# Patient Record
Sex: MALE | Race: OTHER | HISPANIC OR LATINO | URBAN - METROPOLITAN AREA
[De-identification: names, ages, dates, MRNs, and addresses within clinical notes are randomized per-mention and may not be internally consistent; named-entity substitution may affect disease eponyms.]

---

## 2018-09-07 ENCOUNTER — EMERGENCY (EMERGENCY)
Facility: HOSPITAL | Age: 38
LOS: 1 days | Discharge: HOME | End: 2018-09-07
Attending: EMERGENCY MEDICINE | Admitting: EMERGENCY MEDICINE
Payer: COMMERCIAL

## 2018-09-07 VITALS
OXYGEN SATURATION: 100 % | TEMPERATURE: 99 F | RESPIRATION RATE: 18 BRPM | DIASTOLIC BLOOD PRESSURE: 84 MMHG | SYSTOLIC BLOOD PRESSURE: 123 MMHG | HEART RATE: 81 BPM

## 2018-09-07 VITALS
OXYGEN SATURATION: 98 % | RESPIRATION RATE: 18 BRPM | SYSTOLIC BLOOD PRESSURE: 119 MMHG | DIASTOLIC BLOOD PRESSURE: 81 MMHG | HEART RATE: 76 BPM

## 2018-09-07 LAB
ANION GAP SERPL CALC-SCNC: 11 MMOL/L — SIGNIFICANT CHANGE UP (ref 5–17)
BASOPHILS NFR BLD AUTO: 0.1 % — SIGNIFICANT CHANGE UP (ref 0–2)
BUN SERPL-MCNC: 14 MG/DL — SIGNIFICANT CHANGE UP (ref 7–23)
CALCIUM SERPL-MCNC: 9.9 MG/DL — SIGNIFICANT CHANGE UP (ref 8.4–10.5)
CHLORIDE SERPL-SCNC: 102 MMOL/L — SIGNIFICANT CHANGE UP (ref 96–108)
CO2 SERPL-SCNC: 26 MMOL/L — SIGNIFICANT CHANGE UP (ref 22–31)
CREAT SERPL-MCNC: 1.19 MG/DL — SIGNIFICANT CHANGE UP (ref 0.5–1.3)
EOSINOPHIL NFR BLD AUTO: 0.5 % — SIGNIFICANT CHANGE UP (ref 0–6)
GLUCOSE SERPL-MCNC: 101 MG/DL — HIGH (ref 70–99)
HCT VFR BLD CALC: 42.4 % — SIGNIFICANT CHANGE UP (ref 39–50)
HGB BLD-MCNC: 14.4 G/DL — SIGNIFICANT CHANGE UP (ref 13–17)
LYMPHOCYTES # BLD AUTO: 14.9 % — SIGNIFICANT CHANGE UP (ref 13–44)
MCHC RBC-ENTMCNC: 30.5 PG — SIGNIFICANT CHANGE UP (ref 27–34)
MCHC RBC-ENTMCNC: 34 G/DL — SIGNIFICANT CHANGE UP (ref 32–36)
MCV RBC AUTO: 89.8 FL — SIGNIFICANT CHANGE UP (ref 80–100)
MONOCYTES NFR BLD AUTO: 6.7 % — SIGNIFICANT CHANGE UP (ref 2–14)
NEUTROPHILS NFR BLD AUTO: 77.8 % — HIGH (ref 43–77)
PLATELET # BLD AUTO: 192 K/UL — SIGNIFICANT CHANGE UP (ref 150–400)
POTASSIUM SERPL-MCNC: 4.9 MMOL/L — SIGNIFICANT CHANGE UP (ref 3.5–5.3)
POTASSIUM SERPL-SCNC: 4.9 MMOL/L — SIGNIFICANT CHANGE UP (ref 3.5–5.3)
RBC # BLD: 4.72 M/UL — SIGNIFICANT CHANGE UP (ref 4.2–5.8)
RBC # FLD: 12.6 % — SIGNIFICANT CHANGE UP (ref 10.3–16.9)
SODIUM SERPL-SCNC: 139 MMOL/L — SIGNIFICANT CHANGE UP (ref 135–145)
TROPONIN T SERPL-MCNC: <0.01 NG/ML — SIGNIFICANT CHANGE UP (ref 0–0.01)
WBC # BLD: 10.7 K/UL — HIGH (ref 3.8–10.5)
WBC # FLD AUTO: 10.7 K/UL — HIGH (ref 3.8–10.5)

## 2018-09-07 PROCEDURE — 99284 EMERGENCY DEPT VISIT MOD MDM: CPT | Mod: 25

## 2018-09-07 PROCEDURE — 85025 COMPLETE CBC W/AUTO DIFF WBC: CPT

## 2018-09-07 PROCEDURE — 71046 X-RAY EXAM CHEST 2 VIEWS: CPT

## 2018-09-07 PROCEDURE — 36415 COLL VENOUS BLD VENIPUNCTURE: CPT

## 2018-09-07 PROCEDURE — 96374 THER/PROPH/DIAG INJ IV PUSH: CPT

## 2018-09-07 PROCEDURE — 99285 EMERGENCY DEPT VISIT HI MDM: CPT | Mod: 25

## 2018-09-07 PROCEDURE — 93010 ELECTROCARDIOGRAM REPORT: CPT | Mod: NC

## 2018-09-07 PROCEDURE — 84484 ASSAY OF TROPONIN QUANT: CPT

## 2018-09-07 PROCEDURE — 93005 ELECTROCARDIOGRAM TRACING: CPT

## 2018-09-07 PROCEDURE — 80048 BASIC METABOLIC PNL TOTAL CA: CPT

## 2018-09-07 PROCEDURE — 71046 X-RAY EXAM CHEST 2 VIEWS: CPT | Mod: 26

## 2018-09-07 RX ORDER — KETOROLAC TROMETHAMINE 30 MG/ML
30 SYRINGE (ML) INJECTION ONCE
Qty: 0 | Refills: 0 | Status: DISCONTINUED | OUTPATIENT
Start: 2018-09-07 | End: 2018-09-07

## 2018-09-07 RX ADMIN — Medication 30 MILLIGRAM(S): at 14:59

## 2018-09-07 RX ADMIN — Medication 30 MILLIGRAM(S): at 15:14

## 2018-09-07 NOTE — ED PROVIDER NOTE - OBJECTIVE STATEMENT
37 year old male with no medical history presents to ED from urgent care for evaluation of chest discomfort today.  Patient states pain began several hours ago and has been persistent since onset, though he notes feeling improved from initial onset.  Patient denies tobacco use, cough, shortness of breath, fever, chills, abdominal pain, nausea, vomiting, recent travel or heavy lifting, history of coagulopathy or any additional acute complaints or concerns at this time.

## 2018-09-07 NOTE — ED ADULT NURSE NOTE - OBJECTIVE STATEMENT
Pt w no PMH referred from urgent care with c/o sudden onset of chest pain with deep inspiration.  Pt denies recent travel, prolonged immobilization, dizziness, smoking hx. Pt w no PMH referred from urgent care with c/o sudden onset of chest pain with deep inspiration since about 11 am today .  Pt denies recent travel, prolonged immobilization, dizziness, smoking hx.

## 2018-09-07 NOTE — ED ADULT NURSE NOTE - NSIMPLEMENTINTERV_GEN_ALL_ED
Implemented All Universal Safety Interventions:  [place] to call system. Call bell, personal items and telephone within reach. Instruct patient to call for assistance. Room bathroom lighting operational. Non-slip footwear when patient is off stretcher. Physically safe environment: no spills, clutter or unnecessary equipment. Stretcher in lowest position, wheels locked, appropriate side rails in place.

## 2018-09-07 NOTE — ED PROVIDER NOTE - CARDIAC, MLM
Normal rate, regular rhythm.  Heart sounds S1, S2.  No murmurs, rubs or gallops.  No reproducible chest wall pain.

## 2018-09-07 NOTE — ED ADULT TRIAGE NOTE - CHIEF COMPLAINT QUOTE
pt received to er with c/o chest pain that increases with deep breathing that started this morning around 1130 am.  Pt states he took  mg. Denies hx or smoking.

## 2018-09-11 DIAGNOSIS — R07.89 OTHER CHEST PAIN: ICD-10-CM

## 2024-11-19 NOTE — ED PROVIDER NOTE - MEDICAL DECISION MAKING DETAILS
Problem: Sleep Disturbance  Goal: Adequate Sleep/Rest  Outcome: Progressing   Patient complained of anxiety-had Hydroxyzine, effective. [id number] @ 0200. Patient on Keflex for R medial bicep abscess started on 11/16/24 for 7 days. WOCN following/managing the wound, daily dressing changes.  Patient had 5.25 hour of sleep this shift.  No complaints of pain and discomfort. Patient continues on q15 minutes safety checks, no behavioral issues noted. Will continue to monitor the patient and provide therapeutic intervention as needed. Will continue with current plan of care. Notify MD with any concerns.   patient in ED with concern for chest wall pain.  Non toxic in appearance.  EKG, CXR and labs reviewed.  Trop neg.  Patient noting improvement in symptoms with anti inflammatories in ED.  Low risk for cardiac etiology given no med hx, no tobacco use, no fam hx of early cardiac death.  Will plan for dispo with instruction for continued use of anti inflammatories as needed and prompt PCP follow up in 1-2 days for re evaluation.  Patient is aware of plan and agreeable.  He is encouraged to return immediately to ED should symptoms worsen or if there is any concern prior to this recommended follow up.  Patient aware of plan and verbalizes his understanding.  Will discharge at this time.